# Patient Record
Sex: FEMALE | Race: WHITE | NOT HISPANIC OR LATINO | Employment: FULL TIME | URBAN - METROPOLITAN AREA
[De-identification: names, ages, dates, MRNs, and addresses within clinical notes are randomized per-mention and may not be internally consistent; named-entity substitution may affect disease eponyms.]

---

## 2018-11-20 RX ORDER — CETIRIZINE HYDROCHLORIDE 10 MG/1
10 TABLET ORAL DAILY
COMMUNITY

## 2018-11-20 RX ORDER — DIAPER,BRIEF,ADULT, DISPOSABLE
1000 EACH MISCELLANEOUS DAILY
COMMUNITY

## 2018-11-20 RX ORDER — ASPIRIN 81 MG/1
81 TABLET ORAL DAILY
COMMUNITY

## 2018-11-20 RX ORDER — METOPROLOL SUCCINATE 25 MG/1
25 TABLET, EXTENDED RELEASE ORAL DAILY
COMMUNITY
End: 2021-08-03 | Stop reason: DRUGHIGH

## 2018-11-20 NOTE — PROGRESS NOTES
Subjective:     HPI    Cardiologist: Mario Pineda MD    I had the pleasure of seeing Leonie Carranza in consultation at your request for the evaluation of palpitations. 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 She describes episodes which generally occur while resting in bed at night, where she suddenly feels her heart pounding out of her chest. Episodes are associated with lightheadedness and presyncope, and last anywhere from 5-30 minutes. She also describes episodes where she develops dizziness which is not associated with palpitations. She was started on Toprol xl 12.5 daily about 2 years ago. This has since been increased, to the point she is currently on Toprol XL 50 mg daily. Her episodes have improved but have not been eradicated on this medication.    Recent cardiac studies include a 2 week event monitor, which showed sinus rhythm with an average HR of 75 bpm. There were a total of 13 patient transmissions for symptoms of palpitations and lightheadedness. The majority of these transmissions correlated with sinus rhythm and sinus tachycardia, although 3 transmissions recorded on 10/5/2018 between 5 PM and 8 PM are most consistent with a short-RP tachycardia (retrograde P waves noted at th terminal portion of the QRS complex).    An echo performed in 10/2018 showed a structurally normal heart with an EF of 55-60%.    My interpretation of today's ECG is sinus arrhythmia at 78 bpm.    Review of Systems   Constitution: Negative for decreased appetite, malaise/fatigue, weight gain and weight loss.   HENT: Negative for sore throat.    Eyes: Negative for blurred vision.   Cardiovascular: Positive for palpitations. Negative for chest pain, dyspnea on exertion, irregular heartbeat, leg swelling, near-syncope, orthopnea, paroxysmal nocturnal dyspnea and syncope.   Respiratory: Negative for shortness of breath.     Skin: Negative for rash.   Musculoskeletal: Negative for arthritis.   Gastrointestinal: Negative for abdominal pain.   Neurological: Positive for light-headedness. Negative for focal weakness.   Psychiatric/Behavioral: Negative for altered mental status.        Objective:    Physical Exam   Constitutional: She is oriented to person, place, and time. She appears well-developed and well-nourished. No distress.   HENT:   Head: Normocephalic and atraumatic.   Mouth/Throat: Oropharynx is clear and moist.   Eyes: Conjunctivae are normal. Pupils are equal, round, and reactive to light. No scleral icterus.   Neck: Normal range of motion. Neck supple. No JVD present. No thyromegaly present.   Cardiovascular: Normal rate, regular rhythm, normal heart sounds and intact distal pulses. Exam reveals no gallop and no friction rub.   No murmur heard.  Pulmonary/Chest: Effort normal and breath sounds normal. No respiratory distress. She has no rales.   Abdominal: Soft. Bowel sounds are normal. She exhibits no distension.   Musculoskeletal: She exhibits no edema.   Neurological: She is alert and oriented to person, place, and time.   Skin: Skin is warm and dry.   Psychiatric: She has a normal mood and affect. Her behavior is normal.         Assessment:       1. Palpitations    2. 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 We discussed in detail the pathophysiology of SVT as well as the treatment options available to manage it. We specifically discussed the risks, benefits, indications, and alternatives to invasive EPS and SVT ablation. After considering her options she has agreed to proceed. Metoprolol will be held 48 hours prior to the procedure.    Thank you for allowing me to participate in the care of this patient. Please do not hesitate to call me with any questions or concerns.

## 2018-11-21 PROBLEM — R00.2 PALPITATIONS: Status: ACTIVE | Noted: 2018-11-21

## 2018-11-23 ENCOUNTER — INITIAL CONSULT (OUTPATIENT)
Dept: CARDIOLOGY | Facility: CLINIC | Age: 33
End: 2018-11-23
Payer: COMMERCIAL

## 2018-11-23 DIAGNOSIS — I47.10 SVT (SUPRAVENTRICULAR TACHYCARDIA): ICD-10-CM

## 2018-11-23 DIAGNOSIS — R00.2 PALPITATIONS: ICD-10-CM

## 2018-11-23 PROCEDURE — 99205 OFFICE O/P NEW HI 60 MIN: CPT | Mod: ,,, | Performed by: INTERNAL MEDICINE

## 2018-12-03 ENCOUNTER — TELEPHONE (OUTPATIENT)
Dept: ELECTROPHYSIOLOGY | Facility: CLINIC | Age: 33
End: 2018-12-03

## 2018-12-03 NOTE — TELEPHONE ENCOUNTER
Called Pt to schedule SVT/RFA with Dr Lee. Pt stated she is waiting on a call from her insurance and will call me back once she hears from them.

## 2021-08-03 ENCOUNTER — OFFICE VISIT (OUTPATIENT)
Dept: OBSTETRICS AND GYNECOLOGY | Facility: CLINIC | Age: 36
End: 2021-08-03
Payer: COMMERCIAL

## 2021-08-03 VITALS
HEIGHT: 60 IN | BODY MASS INDEX: 24.35 KG/M2 | DIASTOLIC BLOOD PRESSURE: 80 MMHG | WEIGHT: 124 LBS | SYSTOLIC BLOOD PRESSURE: 112 MMHG

## 2021-08-03 DIAGNOSIS — Z01.419 ENCOUNTER FOR WELL WOMAN EXAM WITH ROUTINE GYNECOLOGICAL EXAM: Primary | ICD-10-CM

## 2021-08-03 DIAGNOSIS — F41.9 ANXIETY: ICD-10-CM

## 2021-08-03 PROCEDURE — 99395 PREV VISIT EST AGE 18-39: CPT | Mod: S$GLB,,, | Performed by: NURSE PRACTITIONER

## 2021-08-03 PROCEDURE — 99395 PR PREVENTIVE VISIT,EST,18-39: ICD-10-PCS | Mod: S$GLB,,, | Performed by: NURSE PRACTITIONER

## 2021-08-03 RX ORDER — NORETHINDRONE ACETATE AND ETHINYL ESTRADIOL, ETHINYL ESTRADIOL AND FERROUS FUMARATE 1MG-10(24)
1 KIT ORAL DAILY
Qty: 84 TABLET | Refills: 4 | Status: SHIPPED | OUTPATIENT
Start: 2021-08-03

## 2021-08-03 RX ORDER — METOPROLOL SUCCINATE 50 MG/1
50 TABLET, EXTENDED RELEASE ORAL DAILY
COMMUNITY
Start: 2021-06-15

## 2021-08-03 RX ORDER — SERTRALINE HYDROCHLORIDE 50 MG/1
50 TABLET, FILM COATED ORAL DAILY
Qty: 90 TABLET | Refills: 4 | Status: SHIPPED | OUTPATIENT
Start: 2021-08-03 | End: 2022-08-08

## 2021-08-03 RX ORDER — NORETHINDRONE ACETATE AND ETHINYL ESTRADIOL, ETHINYL ESTRADIOL AND FERROUS FUMARATE 1MG-10(24)
1 KIT ORAL DAILY
COMMUNITY
Start: 2021-05-10 | End: 2021-08-03 | Stop reason: SDUPTHER

## 2022-05-17 ENCOUNTER — TELEPHONE (OUTPATIENT)
Dept: OBSTETRICS AND GYNECOLOGY | Facility: CLINIC | Age: 37
End: 2022-05-17
Payer: OTHER GOVERNMENT

## 2022-05-17 NOTE — TELEPHONE ENCOUNTER
Ok for letter stating that her anxiety is controlled on Zoloft and she is not having mental health issues and can function normally.  Please call pt to see what else is needed.

## 2022-05-17 NOTE — TELEPHONE ENCOUNTER
----- Message from Ofelia Zavaleta sent at 5/17/2022 12:24 PM CDT -----  Contact: pt  Type: Needs Medical Advice    Who Called:  pt  Best Call Back Number: [phone]  Additional Information: Requesting a call back regarding pt is trying to foster a child. And needing a letter saying she is sane.. That her taking sertraline (ZOLOFT) 50 MG tablet.. Does not effect her day to day life. Please call pt on this and to make sure letter is correct or for better understanding on what she needs.      Please Advise ---Thank you

## 2022-05-18 NOTE — TELEPHONE ENCOUNTER
Letter is together and spoke with patient to see what else she needed she stated just something stating that her anxiety is under control and she was advised to  at either locations at her convenience.

## 2022-08-08 RX ORDER — SERTRALINE HYDROCHLORIDE 50 MG/1
TABLET, FILM COATED ORAL
Qty: 90 TABLET | Refills: 0 | Status: SHIPPED | OUTPATIENT
Start: 2022-08-08